# Patient Record
Sex: MALE | Race: WHITE | HISPANIC OR LATINO | Employment: UNEMPLOYED | URBAN - METROPOLITAN AREA
[De-identification: names, ages, dates, MRNs, and addresses within clinical notes are randomized per-mention and may not be internally consistent; named-entity substitution may affect disease eponyms.]

---

## 2021-11-23 ENCOUNTER — OFFICE VISIT (OUTPATIENT)
Dept: SURGERY | Facility: CLINIC | Age: 41
End: 2021-11-23
Payer: COMMERCIAL

## 2021-11-23 VITALS
WEIGHT: 169.19 LBS | DIASTOLIC BLOOD PRESSURE: 68 MMHG | HEIGHT: 66 IN | BODY MASS INDEX: 27.19 KG/M2 | HEART RATE: 76 BPM | SYSTOLIC BLOOD PRESSURE: 115 MMHG

## 2021-11-23 DIAGNOSIS — R10.31 RIGHT GROIN PAIN: Primary | ICD-10-CM

## 2021-11-23 PROCEDURE — 99999 PR PBB SHADOW E&M-NEW PATIENT-LVL III: CPT | Mod: PBBFAC,,, | Performed by: SURGERY

## 2021-11-23 PROCEDURE — 99202 PR OFFICE/OUTPT VISIT, NEW, LEVL II, 15-29 MIN: ICD-10-PCS | Mod: S$GLB,,, | Performed by: SURGERY

## 2021-11-23 PROCEDURE — 99999 PR PBB SHADOW E&M-NEW PATIENT-LVL III: ICD-10-PCS | Mod: PBBFAC,,, | Performed by: SURGERY

## 2021-11-23 PROCEDURE — 99202 OFFICE O/P NEW SF 15 MIN: CPT | Mod: S$GLB,,, | Performed by: SURGERY

## 2021-11-23 RX ORDER — OMEGA-3 FATTY ACIDS/FISH OIL 340-1000MG
CAPSULE ORAL
COMMUNITY
Start: 2021-09-28

## 2021-11-23 RX ORDER — ELVITEGRAVIR, COBICISTAT, EMTRICITABINE, AND TENOFOVIR ALAFENAMIDE 150; 150; 200; 10 MG/1; MG/1; MG/1; MG/1
1 TABLET ORAL DAILY
COMMUNITY
Start: 2021-11-16

## 2021-11-23 RX ORDER — OMEPRAZOLE 40 MG/1
40 CAPSULE, DELAYED RELEASE ORAL DAILY
COMMUNITY
Start: 2021-06-24

## 2021-12-21 ENCOUNTER — HOSPITAL ENCOUNTER (OUTPATIENT)
Dept: RADIOLOGY | Facility: HOSPITAL | Age: 41
Discharge: HOME | End: 2021-12-21
Attending: SURGERY
Payer: COMMERCIAL

## 2021-12-21 DIAGNOSIS — R10.31 RIGHT GROIN PAIN: ICD-10-CM

## 2021-12-21 PROCEDURE — 76870 US EXAM SCROTUM: CPT | Mod: 26,,, | Performed by: RADIOLOGY

## 2021-12-21 PROCEDURE — 76870 US EXAM SCROTUM: CPT | Mod: TC

## 2021-12-21 PROCEDURE — 76870 US SCROTUM AND TESTICLES: ICD-10-PCS | Mod: 26,,, | Performed by: RADIOLOGY

## 2021-12-23 ENCOUNTER — TELEPHONE (OUTPATIENT)
Dept: SURGERY | Facility: CLINIC | Age: 41
End: 2021-12-23
Payer: COMMERCIAL

## 2021-12-23 DIAGNOSIS — N50.89 TESTICULAR MICROLITHIASIS: Primary | ICD-10-CM

## 2022-01-11 ENCOUNTER — OFFICE VISIT (OUTPATIENT)
Dept: UROLOGY | Facility: CLINIC | Age: 42
End: 2022-01-11
Payer: COMMERCIAL

## 2022-01-11 VITALS
HEART RATE: 68 BPM | BODY MASS INDEX: 27.42 KG/M2 | WEIGHT: 170.63 LBS | HEIGHT: 66 IN | DIASTOLIC BLOOD PRESSURE: 81 MMHG | SYSTOLIC BLOOD PRESSURE: 129 MMHG

## 2022-01-11 DIAGNOSIS — N50.811 RIGHT TESTICULAR PAIN: Primary | ICD-10-CM

## 2022-01-11 DIAGNOSIS — N50.89 TESTICULAR MICROLITHIASIS: ICD-10-CM

## 2022-01-11 PROCEDURE — 99203 OFFICE O/P NEW LOW 30 MIN: CPT | Mod: S$GLB,,, | Performed by: NURSE PRACTITIONER

## 2022-01-11 PROCEDURE — 3008F PR BODY MASS INDEX (BMI) DOCUMENTED: ICD-10-PCS | Mod: CPTII,S$GLB,, | Performed by: NURSE PRACTITIONER

## 2022-01-11 PROCEDURE — 3008F BODY MASS INDEX DOCD: CPT | Mod: CPTII,S$GLB,, | Performed by: NURSE PRACTITIONER

## 2022-01-11 PROCEDURE — 3074F PR MOST RECENT SYSTOLIC BLOOD PRESSURE < 130 MM HG: ICD-10-PCS | Mod: CPTII,S$GLB,, | Performed by: NURSE PRACTITIONER

## 2022-01-11 PROCEDURE — 1160F PR REVIEW ALL MEDS BY PRESCRIBER/CLIN PHARMACIST DOCUMENTED: ICD-10-PCS | Mod: CPTII,S$GLB,, | Performed by: NURSE PRACTITIONER

## 2022-01-11 PROCEDURE — 99203 PR OFFICE/OUTPT VISIT, NEW, LEVL III, 30-44 MIN: ICD-10-PCS | Mod: S$GLB,,, | Performed by: NURSE PRACTITIONER

## 2022-01-11 PROCEDURE — 1159F MED LIST DOCD IN RCRD: CPT | Mod: CPTII,S$GLB,, | Performed by: NURSE PRACTITIONER

## 2022-01-11 PROCEDURE — 3079F DIAST BP 80-89 MM HG: CPT | Mod: CPTII,S$GLB,, | Performed by: NURSE PRACTITIONER

## 2022-01-11 PROCEDURE — 3074F SYST BP LT 130 MM HG: CPT | Mod: CPTII,S$GLB,, | Performed by: NURSE PRACTITIONER

## 2022-01-11 PROCEDURE — 3079F PR MOST RECENT DIASTOLIC BLOOD PRESSURE 80-89 MM HG: ICD-10-PCS | Mod: CPTII,S$GLB,, | Performed by: NURSE PRACTITIONER

## 2022-01-11 PROCEDURE — 99999 PR PBB SHADOW E&M-EST. PATIENT-LVL III: ICD-10-PCS | Mod: PBBFAC,,, | Performed by: NURSE PRACTITIONER

## 2022-01-11 PROCEDURE — 1159F PR MEDICATION LIST DOCUMENTED IN MEDICAL RECORD: ICD-10-PCS | Mod: CPTII,S$GLB,, | Performed by: NURSE PRACTITIONER

## 2022-01-11 PROCEDURE — 99999 PR PBB SHADOW E&M-EST. PATIENT-LVL III: CPT | Mod: PBBFAC,,, | Performed by: NURSE PRACTITIONER

## 2022-01-11 PROCEDURE — 1160F RVW MEDS BY RX/DR IN RCRD: CPT | Mod: CPTII,S$GLB,, | Performed by: NURSE PRACTITIONER

## 2022-01-11 NOTE — PROGRESS NOTES
: Essence [id number]    CHIEF COMPLAINT:    Mr. Del Rio is a 41 y.o. male presenting for testicular microlithiasis.      PRESENTING ILLNESS:    Cooper Del Rio is a 41 y.o. male who presents for testicular microlithiasis    Presents today as referral due to findings of testicular microlithiasis.  An u/s was obtained due to right scrotal pain.  Continues to have right testicular pain with a hard days work.  Discussed taking NSAIds for discomfort, wearing supportive underwear, and applying ice to scrotum in the evening.      Discussed finding of testicular microlithiasis on ultrasounds.  Has no known risk factors.  Denies family or personal history of testicular cancer.  Will repeat ultrasound in 6 months to reassess for changes.    He reports a good urinary stream and complete bladder emptying. Has no urinary complaints today.    No family history of prostate cancer.     REVIEW OF SYSTEMS:    Review of Systems   Constitutional: Negative for chills and fever.   Respiratory: Negative for shortness of breath.    Cardiovascular: Negative for chest pain.   Gastrointestinal: Negative for constipation and diarrhea.   Genitourinary: Negative for dysuria, flank pain, frequency, hematuria and urgency.   Neurological: Negative for dizziness and weakness.       PATIENT HISTORY:    No past medical history on file.    No family history on file.    Allergies:  Patient has no known allergies.    Medications:    Current Outpatient Medications:     GENVOYA [phone] mg Tab, Take 1 tablet by mouth once daily., Disp: , Rfl:     FISH -1,000 mg Cap, Take by mouth., Disp: , Rfl:     omeprazole (PRILOSEC) 40 MG capsule, Take 40 mg by mouth once daily., Disp: , Rfl:     PHYSICAL EXAMINATION:    Physical Exam  Vitals and nursing note reviewed.   Constitutional:       Appearance: Normal appearance. He is well-developed.   HENT:      Head: Normocephalic and atraumatic.   Eyes:      Pupils: Pupils are equal, round, and  reactive to light.   Pulmonary:      Effort: Pulmonary effort is normal.   Genitourinary:     Penis: Normal.       Testes: Normal.   Musculoskeletal:         General: Normal range of motion.      Cervical back: Normal range of motion.   Skin:     General: Skin is warm and dry.   Neurological:      Mental Status: He is alert and oriented to person, place, and time.   Psychiatric:         Mood and Affect: Mood and affect normal.         Behavior: Behavior normal.           LABS:    No results found for: PSA, PSADIAG, PSATOTAL, PSAFREE, PSAFREEPCT    IMPRESSION:  Encounter Diagnoses   Name Primary?    Testicular microlithiasis      US Scrotum and Testes 12/21/21:  Impression:     1. Testicular microlithiasis without intratesticular mass or worrisome finding. In the absence of any other risk factors for testicular cancer (personal history of testicular cancer, family member with testicular cancer, history of cryptorchidism, testicular atrophy or other risk factors) no further imaging or biochemical follow-up is necessary; all that is recommended is routine monthly testicular self-examination. However, if the patient has risk factors for testicular cancer, referral to a urologist for evaluation at the termination of and a optimal follow-up strategy is recommended.  2. Right varicocele.  3. Bilateral epididymal head cysts.       PLAN:  Problem List Items Addressed This Visit    None     Visit Diagnoses     Testicular microlithiasis              1. Testicular microlithiasis   - repeat imaging in 6 mths   - no risk factory noted  2. Right testicular pain   For scrotal pain: ibuprofen, good scrotal support, rest, elevate scrotum at night  3.  RTC in 6 mths with imaging prior to visit    Tatum Inman NP

## 2022-10-24 ENCOUNTER — TELEPHONE (OUTPATIENT)
Dept: HEPATOLOGY | Facility: CLINIC | Age: 42
End: 2022-10-24
Payer: COMMERCIAL

## 2023-01-09 NOTE — PROGRESS NOTES
[facility] Consultation Note    Reason for Consult:  The primary encounter diagnosis was Fatty liver. A diagnosis of Elevated liver enzymes was also pertinent to this visit.    PCP: Liam Baker   [address] [facility]*    HPI:  This is a 42 y.o. male here for evaluation of: Cirrhosis?  Elevated transaminases AST 67,  , but PLT normal, 221k on 1/9/2023    Referred by Dr Wheeler for cirrhosis.      Review of record in Epic shows very little information.     He has some labs in 2019 in [facility] record, his HAV Ab was positive, HBsAg, was neg, HCV Ab neg, HIV 1 positive (on treatment for 14 years, currently on Genvoya). No imaging available. Plt ct was 177, 183 in 2019.        Care Everywhere 4 yrs ago:  EGD at [facility] for epigastric pain. H Pylori was positive. Treated then.    He was also told he had fatty liver, but not cirrhosis.      US abd 4/30/2019:  1. Hepatic steatosis.   2. No cholelithiasis or evidence of cholecystitis.     He had CT A/P 5/2019 without hepatobiliary or splenic abnormalities.     A new CT scan was done at [facility] two days ago, 1/10/23:   LIVER: Normal.   GALLBLADDER & EXTRAHEPATIC DUCT: Normal.   PANCREAS: Normal.   SPLEEN: Normal.     IMPRESSION:   No evidence of acute intra-abdominal or pelvic abnormalities. No evidence of acute appendicitis.      He follows with Dr. Irizarry in [place] for his HIV and remains on medication with undetectable viral load.     Hep B neg, HCV Ab neg in 2019.   Immune to hep A 2019.        Elevated liver enzymes: Yes  Abnormal imaging: No  Cirrhosis: No  Hepatitis C: No  Hepatitis B: No  Fatty liver: Yes  Encephalopathy: No  Post-hospital discharge: No  Symptoms: none    Primary hepatic manifestations:  Fatigue:No  Edema:No  Ascites:No  Encephalopathy:No  Abdominal pain:No  GI bleeds: No  Pruritus:No  Weight Changes:No  Changes in Bowel habits: No  Muscle cramps:No    Risk factors for liver  "disease:  No jaundice  No transfusions  No IVDU  Did not snort cocaine or similar agents  Did not live with anyone with hepatitis B or C  Sexual partner not tested  No hepatotoxic medications  No exposure to industrial toxins  Alcohol:  Drinks 3-6 beers every day after work x 20 yrs.  He thinks he can stop alcohol whenever he is told to.         ROS:  Constitutional: No fevers, chills, weight changes, fatigue  ENT: No allergies, nosebleeds,   CV: No chest pain  Pulm: No cough, shortness of breath  Ophtho: No vision changes  GI/Liver: see HPI  Derm: No rash, itching  Heme: No swollen glands, bruising  MSK: No joint pains, joint swelling  : No dysuria, hematuria, decrease in urine output  Endo: No hot or cold intolerance  Neuro: No confusion, disorientation, difficulty with sleep, memory, concentration, syncope, seizure  Psych: No anxiety, depression    Medical History:  has no past medical history on file.    Surgical History:  has no past surgical history on file.    Family History: family history is not on file..     Social History:  reports that he has never smoked. He has never used smokeless tobacco. He reports current alcohol use of about 6.0 standard drinks per week. He reports that he does not use drugs.    Review of patient's allergies indicates:  No Known Allergies    Current Outpatient Rx   Medication Sig Dispense Refill    amLODIPine (NORVASC) 2.5 MG tablet Take 2.5 mg by mouth.      famotidine (PEPCID) 20 MG tablet Take 20 mg by mouth 2 (two) times daily.      FISH -1,000 mg Cap Take by mouth.      GENVOYA [phone] mg Tab Take 1 tablet by mouth once daily.      omeprazole (PRILOSEC) 40 MG capsule Take 40 mg by mouth once daily.         Objective Findings:    Vital Signs:  /66 (BP Location: Right arm, Patient Position: Sitting, BP Method: Medium (Automatic))   Pulse 78   Temp 98 °F (36.7 °C) (Oral)   Resp 18   Ht 5' 6" (1.676 m)   Wt 76.7 kg (169 lb 1.5 oz)   SpO2 97%   BMI " 27.29 kg/m²   Body mass index is 27.29 kg/m².    Physical Exam:  General Appearance: Well appearing in no acute distress  Head:   Normocephalic, without obvious abnormality  Eyes:    No scleral icterus, EOMI  Abdomen: Soft, non tender, non distended . No hepatosplenomegaly, ascites, or mass  Extremities:  no clubbing, cyanosis or edema  Skin: No rash  Neurologic:  alert, oriented x 3. No asterixis      Labs:  No results found for: WBC, HGB, HCT, PLT, CHOL, TRIG, HDL, LDLDIRECT, INR, CREATININE, BUN, BILITOT, ALT, AST, ALKPHOS, NA, K, CL, CO2, TSH, PSA, GLUF, HGBA1C, MICROALBUR, AFP    Imaging:   In care Everywhere: neg for cirrhosis    Endoscopy:      Assessment:  1. Fatty liver    2. Elevated liver enzymes    Probably alcohol-related enzyme elevation.  Will check other serologies for etiology.   Imaging from 2019 and 1/10/23 not supportive of cirrhosis.   Will check with fibroscan in 3 months after he stops alcohol.        Recommendations:  -  Fibroscan in 3 months.   -  Labs today:  Liver profile, PANCHO, Ceruloplasmin, Alpha-1 antitrypsin, iron saturation, Ferritin  -  Labs in 3 months: liver profile after stopping alcohol x 3 months  -  Avoid alcohol, smoking, sedatives and meds with codeine.  -  Return in 3 months      Follow up in about 3 months (around 4/12/2023).      Order summary:  Orders Placed This Encounter   Procedures    FibroScan [place]    PANCHO Screen w/Reflex    Ceruloplasmin    Alpha 1 Antitrypsin Phenotype    Ferritin    Iron and TIBC    Hepatic Function Panel       Thank you so much for allowing me to participate in the care of Cooper Pro MD

## 2023-01-12 ENCOUNTER — LAB VISIT (OUTPATIENT)
Dept: LAB | Facility: HOSPITAL | Age: 43
End: 2023-01-12
Attending: INTERNAL MEDICINE
Payer: COMMERCIAL

## 2023-01-12 ENCOUNTER — OFFICE VISIT (OUTPATIENT)
Dept: HEPATOLOGY | Facility: CLINIC | Age: 43
End: 2023-01-12
Payer: COMMERCIAL

## 2023-01-12 VITALS
DIASTOLIC BLOOD PRESSURE: 66 MMHG | OXYGEN SATURATION: 97 % | SYSTOLIC BLOOD PRESSURE: 122 MMHG | RESPIRATION RATE: 18 BRPM | HEART RATE: 78 BPM | BODY MASS INDEX: 27.17 KG/M2 | HEIGHT: 66 IN | TEMPERATURE: 98 F | WEIGHT: 169.06 LBS

## 2023-01-12 DIAGNOSIS — R74.8 ELEVATED LIVER ENZYMES: ICD-10-CM

## 2023-01-12 DIAGNOSIS — K76.0 FATTY LIVER: Primary | ICD-10-CM

## 2023-01-12 LAB
ALBUMIN SERPL BCP-MCNC: 4 G/DL (ref 3.5–5.2)
ALP SERPL-CCNC: 94 U/L (ref 55–135)
ALT SERPL W/O P-5'-P-CCNC: 186 U/L (ref 10–44)
AST SERPL-CCNC: 71 U/L (ref 10–40)
BILIRUB DIRECT SERPL-MCNC: 0.2 MG/DL (ref 0.1–0.3)
BILIRUB SERPL-MCNC: 0.4 MG/DL (ref 0.1–1)
CERULOPLASMIN SERPL-MCNC: 36 MG/DL (ref 15–45)
FERRITIN SERPL-MCNC: 671 NG/ML (ref 20–300)
IRON SERPL-MCNC: 66 UG/DL (ref 45–160)
PROT SERPL-MCNC: 8.6 G/DL (ref 6–8.4)
SATURATED IRON: 15 % (ref 20–50)
TOTAL IRON BINDING CAPACITY: 440 UG/DL (ref 250–450)
TRANSFERRIN SERPL-MCNC: 297 MG/DL (ref 200–375)

## 2023-01-12 PROCEDURE — 82390 ASSAY OF CERULOPLASMIN: CPT | Performed by: INTERNAL MEDICINE

## 2023-01-12 PROCEDURE — 1159F PR MEDICATION LIST DOCUMENTED IN MEDICAL RECORD: ICD-10-PCS | Mod: CPTII,S$GLB,, | Performed by: INTERNAL MEDICINE

## 2023-01-12 PROCEDURE — 99999 PR PBB SHADOW E&M-EST. PATIENT-LVL IV: CPT | Mod: PBBFAC,,, | Performed by: INTERNAL MEDICINE

## 2023-01-12 PROCEDURE — 80076 HEPATIC FUNCTION PANEL: CPT | Performed by: INTERNAL MEDICINE

## 2023-01-12 PROCEDURE — 86038 ANTINUCLEAR ANTIBODIES: CPT | Performed by: INTERNAL MEDICINE

## 2023-01-12 PROCEDURE — 3078F PR MOST RECENT DIASTOLIC BLOOD PRESSURE < 80 MM HG: ICD-10-PCS | Mod: CPTII,S$GLB,, | Performed by: INTERNAL MEDICINE

## 2023-01-12 PROCEDURE — 82103 ALPHA-1-ANTITRYPSIN TOTAL: CPT | Performed by: INTERNAL MEDICINE

## 2023-01-12 PROCEDURE — 3008F PR BODY MASS INDEX (BMI) DOCUMENTED: ICD-10-PCS | Mod: CPTII,S$GLB,, | Performed by: INTERNAL MEDICINE

## 2023-01-12 PROCEDURE — 3074F SYST BP LT 130 MM HG: CPT | Mod: CPTII,S$GLB,, | Performed by: INTERNAL MEDICINE

## 2023-01-12 PROCEDURE — 3078F DIAST BP <80 MM HG: CPT | Mod: CPTII,S$GLB,, | Performed by: INTERNAL MEDICINE

## 2023-01-12 PROCEDURE — 1159F MED LIST DOCD IN RCRD: CPT | Mod: CPTII,S$GLB,, | Performed by: INTERNAL MEDICINE

## 2023-01-12 PROCEDURE — 82728 ASSAY OF FERRITIN: CPT | Performed by: INTERNAL MEDICINE

## 2023-01-12 PROCEDURE — 99999 PR PBB SHADOW E&M-EST. PATIENT-LVL IV: ICD-10-PCS | Mod: PBBFAC,,, | Performed by: INTERNAL MEDICINE

## 2023-01-12 PROCEDURE — 36415 COLL VENOUS BLD VENIPUNCTURE: CPT | Performed by: INTERNAL MEDICINE

## 2023-01-12 PROCEDURE — 3008F BODY MASS INDEX DOCD: CPT | Mod: CPTII,S$GLB,, | Performed by: INTERNAL MEDICINE

## 2023-01-12 PROCEDURE — 84466 ASSAY OF TRANSFERRIN: CPT | Performed by: INTERNAL MEDICINE

## 2023-01-12 PROCEDURE — 82104 ALPHA-1-ANTITRYPSIN PHENO: CPT | Performed by: INTERNAL MEDICINE

## 2023-01-12 PROCEDURE — 99205 PR OFFICE/OUTPT VISIT, NEW, LEVL V, 60-74 MIN: ICD-10-PCS | Mod: S$GLB,,, | Performed by: INTERNAL MEDICINE

## 2023-01-12 PROCEDURE — 99205 OFFICE O/P NEW HI 60 MIN: CPT | Mod: S$GLB,,, | Performed by: INTERNAL MEDICINE

## 2023-01-12 PROCEDURE — 3074F PR MOST RECENT SYSTOLIC BLOOD PRESSURE < 130 MM HG: ICD-10-PCS | Mod: CPTII,S$GLB,, | Performed by: INTERNAL MEDICINE

## 2023-01-12 RX ORDER — FAMOTIDINE 20 MG/1
20 TABLET, FILM COATED ORAL 2 TIMES DAILY
COMMUNITY
Start: 2023-01-10

## 2023-01-12 RX ORDER — AMLODIPINE BESYLATE 2.5 MG/1
2.5 TABLET ORAL
COMMUNITY
Start: 2022-10-06

## 2023-01-12 NOTE — PATIENT INSTRUCTIONS
Recommendations:  -  Fibroscan in 3 months.   -  Labs today:  Liver profile, PANCHO, Ceruloplasmin, Alpha-1 antitrypsin, iron saturation, Ferritin  -  Labs in 3 months: liver profile after stopping alcohol x 3 months  -  Avoid alcohol, smoking, sedatives and meds with codeine.  -  Return in 3 months

## 2023-01-13 LAB — ANA SER QL IF: NORMAL

## 2023-01-18 LAB
A1AT PHENOTYP SERPL-IMP: ABNORMAL
A1AT SERPL NEPH-MCNC: 193 MG/DL (ref 100–190)

## 2023-01-19 ENCOUNTER — TELEPHONE (OUTPATIENT)
Dept: HEPATOLOGY | Facility: CLINIC | Age: 43
End: 2023-01-19
Payer: COMMERCIAL

## 2023-01-19 NOTE — TELEPHONE ENCOUNTER
----- Message from Yeni Pro MD sent at 1/12/2023 11:22 AM CST -----  Recommendations:  -  Fibroscan in 3 months.   -  Labs today:  Liver profile, PANCHO, Ceruloplasmin, Alpha-1 antitrypsin, iron saturation, Ferritin  -  Labs in 3 months: liver profile after stopping alcohol x 3 months  -  Avoid alcohol, smoking, sedatives and meds with codeine.  -  Return in 3 months

## 2023-01-19 NOTE — TELEPHONE ENCOUNTER
Fibroscan in 3 months.   -  Labs today:  Liver profile, PANCHO, Ceruloplasmin, Alpha-1 antitrypsin, iron saturation, Ferritin   -  Labs in 3 months: liver profile after stopping alcohol x 3 months   Return in 3 months.   All appts schd in office. IBRAHIMA STOREY

## 2023-01-30 ENCOUNTER — PATIENT MESSAGE (OUTPATIENT)
Dept: HEPATOLOGY | Facility: CLINIC | Age: 43
End: 2023-01-30
Payer: COMMERCIAL

## 2023-02-04 DIAGNOSIS — F10.10 ALCOHOL ABUSE: Primary | ICD-10-CM

## 2023-02-07 ENCOUNTER — TELEPHONE (OUTPATIENT)
Dept: HEPATOLOGY | Facility: CLINIC | Age: 43
End: 2023-02-07
Payer: COMMERCIAL

## 2023-02-07 NOTE — TELEPHONE ENCOUNTER
[facility] Dr. Dumont. TA,MA

## 2023-03-07 ENCOUNTER — LAB VISIT (OUTPATIENT)
Dept: LAB | Facility: HOSPITAL | Age: 43
End: 2023-03-07
Attending: INTERNAL MEDICINE
Payer: COMMERCIAL

## 2023-03-07 DIAGNOSIS — F10.10 ALCOHOL ABUSE: ICD-10-CM

## 2023-03-07 PROCEDURE — 80321 ALCOHOLS BIOMARKERS 1OR 2: CPT | Performed by: INTERNAL MEDICINE

## 2023-03-07 PROCEDURE — 36415 COLL VENOUS BLD VENIPUNCTURE: CPT | Performed by: INTERNAL MEDICINE

## 2023-03-10 LAB
CLINICAL BIOCHEMIST REVIEW: NORMAL
PLPETH BLD-MCNC: NORMAL NG/ML
POPETH BLD-MCNC: 220 NG/ML

## 2025-05-29 DIAGNOSIS — K76.0 FATTY LIVER: ICD-10-CM

## 2025-05-29 DIAGNOSIS — R79.89 ABNORMAL LIVER FUNCTION TESTS: Primary | ICD-10-CM

## 2025-05-30 ENCOUNTER — TELEPHONE (OUTPATIENT)
Dept: HEPATOLOGY | Facility: CLINIC | Age: 45
End: 2025-05-30
Payer: COMMERCIAL

## 2025-05-30 DIAGNOSIS — R74.8 ELEVATED LIVER ENZYMES: ICD-10-CM

## 2025-05-30 DIAGNOSIS — K76.0 FATTY LIVER: Primary | ICD-10-CM

## 2025-05-30 NOTE — TELEPHONE ENCOUNTER
Pt was scheduled to see provider June 4 at 330 pm and 3 pm for Fibroscan... pt also has labs coming June 2

## 2025-06-04 ENCOUNTER — LAB VISIT (OUTPATIENT)
Dept: LAB | Facility: HOSPITAL | Age: 45
End: 2025-06-04
Attending: INTERNAL MEDICINE
Payer: COMMERCIAL

## 2025-06-04 ENCOUNTER — OFFICE VISIT (OUTPATIENT)
Dept: HEPATOLOGY | Facility: CLINIC | Age: 45
End: 2025-06-04
Payer: COMMERCIAL

## 2025-06-04 ENCOUNTER — TELEPHONE (OUTPATIENT)
Dept: HEPATOLOGY | Facility: CLINIC | Age: 45
End: 2025-06-04
Payer: COMMERCIAL

## 2025-06-04 ENCOUNTER — PROCEDURE VISIT (OUTPATIENT)
Dept: HEPATOLOGY | Facility: CLINIC | Age: 45
End: 2025-06-04
Payer: COMMERCIAL

## 2025-06-04 VITALS
BODY MASS INDEX: 28.27 KG/M2 | SYSTOLIC BLOOD PRESSURE: 131 MMHG | HEIGHT: 66 IN | TEMPERATURE: 98 F | DIASTOLIC BLOOD PRESSURE: 85 MMHG | WEIGHT: 175.94 LBS | OXYGEN SATURATION: 97 % | HEART RATE: 85 BPM

## 2025-06-04 DIAGNOSIS — K76.0 FATTY LIVER: ICD-10-CM

## 2025-06-04 DIAGNOSIS — R79.89 ABNORMAL LIVER FUNCTION TESTS: ICD-10-CM

## 2025-06-04 DIAGNOSIS — R74.8 ELEVATED LIVER ENZYMES: ICD-10-CM

## 2025-06-04 DIAGNOSIS — K70.9 ALCOHOLIC LIVER DISEASE: Primary | ICD-10-CM

## 2025-06-04 LAB
ABSOLUTE EOSINOPHIL (OHS): 0.26 K/UL
ABSOLUTE MONOCYTE (OHS): 0.52 K/UL (ref 0.3–1)
ABSOLUTE NEUTROPHIL COUNT (OHS): 2.2 K/UL (ref 1.8–7.7)
ALBUMIN SERPL BCP-MCNC: 4.1 G/DL (ref 3.5–5.2)
ALP SERPL-CCNC: 102 UNIT/L (ref 40–150)
ALT SERPL W/O P-5'-P-CCNC: 119 UNIT/L (ref 10–44)
ANION GAP (OHS): 8 MMOL/L (ref 8–16)
AST SERPL-CCNC: 52 UNIT/L (ref 11–45)
BASOPHILS # BLD AUTO: 0.02 K/UL
BASOPHILS NFR BLD AUTO: 0.4 %
BILIRUB SERPL-MCNC: 0.2 MG/DL (ref 0.1–1)
BUN SERPL-MCNC: 10 MG/DL (ref 6–20)
CALCIUM SERPL-MCNC: 9.2 MG/DL (ref 8.7–10.5)
CHLORIDE SERPL-SCNC: 103 MMOL/L (ref 95–110)
CO2 SERPL-SCNC: 26 MMOL/L (ref 23–29)
CREAT SERPL-MCNC: 0.9 MG/DL (ref 0.5–1.4)
ERYTHROCYTE [DISTWIDTH] IN BLOOD BY AUTOMATED COUNT: 12.5 % (ref 11.5–14.5)
GFR SERPLBLD CREATININE-BSD FMLA CKD-EPI: >60 ML/MIN/1.73/M2
GLUCOSE SERPL-MCNC: 188 MG/DL (ref 70–110)
HCT VFR BLD AUTO: 45.3 % (ref 40–54)
HGB BLD-MCNC: 15.2 GM/DL (ref 14–18)
IMM GRANULOCYTES # BLD AUTO: 0.01 K/UL (ref 0–0.04)
IMM GRANULOCYTES NFR BLD AUTO: 0.2 % (ref 0–0.5)
INR PPP: 1 (ref 0.8–1.2)
LYMPHOCYTES # BLD AUTO: 1.68 K/UL (ref 1–4.8)
MCH RBC QN AUTO: 33.9 PG (ref 27–31)
MCHC RBC AUTO-ENTMCNC: 33.6 G/DL (ref 32–36)
MCV RBC AUTO: 101 FL (ref 82–98)
NUCLEATED RBC (/100WBC) (OHS): 0 /100 WBC
PLATELET # BLD AUTO: 185 K/UL (ref 150–450)
PMV BLD AUTO: 12.2 FL (ref 9.2–12.9)
POTASSIUM SERPL-SCNC: 4.5 MMOL/L (ref 3.5–5.1)
PROT SERPL-MCNC: 7.6 GM/DL (ref 6–8.4)
PROTHROMBIN TIME: 11 SECONDS (ref 9–12.5)
RBC # BLD AUTO: 4.48 M/UL (ref 4.6–6.2)
RELATIVE EOSINOPHIL (OHS): 5.5 %
RELATIVE LYMPHOCYTE (OHS): 35.8 % (ref 18–48)
RELATIVE MONOCYTE (OHS): 11.1 % (ref 4–15)
RELATIVE NEUTROPHIL (OHS): 47 % (ref 38–73)
SODIUM SERPL-SCNC: 137 MMOL/L (ref 136–145)
WBC # BLD AUTO: 4.69 K/UL (ref 3.9–12.7)

## 2025-06-04 PROCEDURE — 3008F BODY MASS INDEX DOCD: CPT | Mod: CPTII,S$GLB,, | Performed by: INTERNAL MEDICINE

## 2025-06-04 PROCEDURE — 99999 PR PBB SHADOW E&M-EST. PATIENT-LVL IV: CPT | Mod: PBBFAC,,, | Performed by: INTERNAL MEDICINE

## 2025-06-04 PROCEDURE — 99213 OFFICE O/P EST LOW 20 MIN: CPT | Mod: S$GLB,,, | Performed by: INTERNAL MEDICINE

## 2025-06-04 PROCEDURE — 91200 LIVER ELASTOGRAPHY: CPT | Mod: S$GLB,,, | Performed by: INTERNAL MEDICINE

## 2025-06-04 PROCEDURE — 85025 COMPLETE CBC W/AUTO DIFF WBC: CPT

## 2025-06-04 PROCEDURE — 85610 PROTHROMBIN TIME: CPT

## 2025-06-04 PROCEDURE — 82374 ASSAY BLOOD CARBON DIOXIDE: CPT

## 2025-06-04 PROCEDURE — 1159F MED LIST DOCD IN RCRD: CPT | Mod: CPTII,S$GLB,, | Performed by: INTERNAL MEDICINE

## 2025-06-04 PROCEDURE — 3075F SYST BP GE 130 - 139MM HG: CPT | Mod: CPTII,S$GLB,, | Performed by: INTERNAL MEDICINE

## 2025-06-04 PROCEDURE — 36415 COLL VENOUS BLD VENIPUNCTURE: CPT

## 2025-06-04 PROCEDURE — 3079F DIAST BP 80-89 MM HG: CPT | Mod: CPTII,S$GLB,, | Performed by: INTERNAL MEDICINE

## 2025-06-04 NOTE — Clinical Note
Recommendations: -  Fibroscan in 1 year (6/2026).    -  Labs today:  Liver profile every 3 months -  Stop alcohol.  -  Return in 6 months

## 2025-06-04 NOTE — Clinical Note
Please send this report to patient:  Fibroscan 6/4/2025  Findings Median liver stiffness score:  8.7 CAP Reading: dB/m:  256   IQR/med %:  11 Interpretation Fibrosis interpretation is based on medial liver stiffness - Kilopascal (kPa).   Fibrosis Stage:  F2 Steatosis interpretation is based on controlled attenuation parameter - (dB/m).   Steatosis Grade:  S1 Comments/Plan:   Fibrosis Stage F2 = Moderate amount of (fibrosis) scarring in the liver   Steatosis Grade S1 = >11% but <34% fat in the liver   Yeni Pro MD

## 2025-06-04 NOTE — PROGRESS NOTES
[facility] Consultation Note    Reason for Consult:  The primary encounter diagnosis was Alcoholic liver disease. Diagnoses of Abnormal liver function tests and Fatty liver were also pertinent to this visit.    PCP: Liam Baker   [address] [place] [facility]*        HPI:  This is a 44 y.o. male here for evaluation of: Cirrhosis?  Elevated transaminases AST 67,  , but PLT normal, 221k on 1/9/2023     online.      Referred by Dr Wheeler for cirrhosis.      Review of labs shows minimal improvement, so the question is has he stopped drinking alcohol?  He hasn't stopped, drinks every day.  Accompanying wife states, he has not done it, but he can stop when he makes up his mind.  I have told him he needs to stop.      I have told him slowly cutting down does not work,  he has to stop all alcohol.      He has some labs in 2019 in [facility] record, his HAV Ab was positive, HBsAg, was neg, HCV Ab neg, HIV 1 positive (on treatment for 14 years, currently on Genvoya). No imaging available. Plt ct was 177, 183 in 2019.        Care Everywhere 4 yrs ago:  EGD at [facility] for epigastric pain. H Pylori was positive. Treated then.  He was also told he had fatty liver, but not cirrhosis.      US abd 4/30/2019:  1. Hepatic steatosis.   2. No cholelithiasis or evidence of cholecystitis.     He had CT A/P 5/2019 without hepatobiliary or splenic abnormalities.     A new CT scan was done at [facility] two days ago, 1/10/23:   LIVER: Normal.   GALLBLADDER & EXTRAHEPATIC DUCT: Normal.   PANCREAS: Normal.   SPLEEN: Normal.     IMPRESSION:   No evidence of acute intra-abdominal or pelvic abnormalities. No evidence of acute appendicitis.      He follows with Dr. Irizarry in [place] for his HIV and remains on medication with undetectable viral load.     Hep B neg, HCV Ab neg in 2019.   Immune to hep A 2019.        Elevated liver enzymes: Yes  Abnormal imaging:  No  Cirrhosis: No  Hepatitis C: No  Hepatitis B: No  Fatty liver: Yes  Encephalopathy: No  Post-hospital discharge: No  Symptoms: none    Primary hepatic manifestations:  Fatigue:No  Edema:No  Ascites:No  Encephalopathy:No  Abdominal pain:No  GI bleeds: No  Pruritus:No  Weight Changes:No  Changes in Bowel habits: No  Muscle cramps:No    Risk factors for liver disease:  No jaundice  No transfusions  No IVDU  Did not snort cocaine or similar agents  Did not live with anyone with hepatitis B or C  Sexual partner not tested  No hepatotoxic medications  No exposure to industrial toxins  Alcohol:  Drinks 3-6 beers every day after work x 20 yrs.  He thinks he can stop alcohol whenever he is told to.         ROS:  Constitutional: No fevers, chills, weight changes, fatigue  ENT: No allergies, nosebleeds,   CV: No chest pain  Pulm: No cough, shortness of breath  Ophtho: No vision changes  GI/Liver: see HPI  Derm: No rash, itching  Heme: No swollen glands, bruising  MSK: No joint pains, joint swelling  : No dysuria, hematuria, decrease in urine output  Endo: No hot or cold intolerance  Neuro: No confusion, disorientation, difficulty with sleep, memory, concentration, syncope, seizure  Psych: No anxiety, depression    Medical History:  has no past medical history on file.    Surgical History:  has no past surgical history on file.    Family History: family history is not on file..     Social History:  reports that he has never smoked. He has never used smokeless tobacco. He reports current alcohol use of about 6.0 standard drinks of alcohol per week. He reports that he does not use drugs.    Review of patient's allergies indicates:  No Known Allergies    Current Outpatient Rx   Medication Sig Dispense Refill    amLODIPine (NORVASC) 2.5 MG tablet Take 2.5 mg by mouth. (Patient not taking: Reported on 6/4/2025)      famotidine (PEPCID) 20 MG tablet Take 20 mg by mouth 2 (two) times daily. (Patient not taking: Reported on  "6/4/2025)      FISH -1,000 mg Cap Take by mouth. (Patient not taking: Reported on 6/4/2025)      GENVOYA [phone] mg Tab Take 1 tablet by mouth once daily. (Patient not taking: Reported on 6/4/2025)      omeprazole (PRILOSEC) 40 MG capsule Take 40 mg by mouth once daily. (Patient not taking: Reported on 6/4/2025)         Objective Findings:    Vital Signs:  /85 (Patient Position: Sitting)   Pulse 85   Temp 98.4 °F (36.9 °C) (Oral)   Ht 5' 6" (1.676 m)   Wt 79.8 kg (175 lb 14.8 oz)   SpO2 97%   BMI 28.40 kg/m²   Body mass index is 28.4 kg/m².    Physical Exam:  General Appearance: Well appearing in no acute distress  Head:   Normocephalic, without obvious abnormality  Eyes:    No scleral icterus, EOMI  Abdomen: Soft, non tender, non distended . No hepatosplenomegaly, ascites, or mass  Extremities:  no clubbing, cyanosis or edema  Skin: No rash  Neurologic:  alert, oriented x 3. No asterixis      Labs:  Lab Results   Component Value Date    WBC 4.69 06/04/2025    HGB 15.2 06/04/2025    HCT 45.3 06/04/2025     06/04/2025    INR 1.0 06/04/2025    CREATININE 0.9 06/04/2025    BUN 10 06/04/2025    BILITOT 0.2 06/04/2025     (H) 06/04/2025    AST 52 (H) 06/04/2025    ALKPHOS 102 06/04/2025     06/04/2025    K 4.5 06/04/2025     06/04/2025    CO2 26 06/04/2025       Imaging:   In care Everywhere: neg for cirrhosis    Endoscopy:      Assessment:  1. Alcoholic liver disease    2. Abnormal liver function tests    3. Fatty liver      Probably alcohol-related enzyme elevation.  Will check other serologies for etiology.   Imaging from 2019 and 1/10/23 not supportive of cirrhosis.     Fibroscan today 6/4/25:  , S2= >34%fat, <67% fat.   KPa 8.7 = F2 = moderate amount of fibrosis.      I have advised him to STOP all alcohol.       Recommendations:  -  Fibroscan in 1 year (6/2026).     -  Labs today:  Liver profile every 3 months  -  Stop alcohol.   -  Return in 6 " months      Follow up in about 6 months (around 12/4/2025).      Order summary:  Orders Placed This Encounter   Procedures    Hepatic Function Panel       Thank you so much for allowing me to participate in the care of Cooper Pro MD       No

## 2025-06-08 ENCOUNTER — RESULTS FOLLOW-UP (OUTPATIENT)
Dept: TRANSPLANT | Facility: CLINIC | Age: 45
End: 2025-06-08

## 2025-06-10 ENCOUNTER — PATIENT MESSAGE (OUTPATIENT)
Dept: HEPATOLOGY | Facility: CLINIC | Age: 45
End: 2025-06-10
Payer: COMMERCIAL

## 2025-06-10 ENCOUNTER — TELEPHONE (OUTPATIENT)
Dept: HEPATOLOGY | Facility: CLINIC | Age: 45
End: 2025-06-10
Payer: COMMERCIAL

## 2025-06-10 NOTE — PROCEDURES
FibroScan Transplant Hepatology(Vibration Controlled Transient Elastography)    Date/Time: 6/4/2025 3:00 PM    Performed by: Yeni Pro MD  Authorized by: Yeni Pro MD    Diagnosis:  NAFLD    Probe:  M    Universal Protocol: Patient's identity, procedure and site were verified, confirmatory pause was performed.  Discussed procedure including risks and potential complications.  Questions answered.  Patient verbalizes understanding and wishes to proceed with VCTE.     Procedure: After providing explanations of the procedure, patient was placed in the supine position with right arm in maximum abduction to allow optimal exposure of right lateral abdomen.  Patient was briefly assessed, Testing was performed in the mid-axillary location, 50Hz Shear Wave pulses were applied and the resulting Shear Wave and Propagation Speed detected with a 3.5 MHz ultrasonic signal, using the FibroScan probe, Skin to liver capsule distance and liver parenchyma were accessed during the entire examination with the FibroScan probe, Patient was instructed to breathe normally and to abstain from sudden movements during the procedure, allowing for random measurements of liver stiffness. At least 10 Shear Waves were produced, Individual measurements of each Shear Wave were calculated.  Patient tolerated the procedure well with no complications.  Meets discharge criteria as was dismissed.  Rates pain 0 out of 10.  Patient will follow up with ordering provider to review results.    Findings  Median liver stiffness score:  8.7  CAP Reading: dB/m:  256    IQR/med %:  11  Interpretation  Fibrosis interpretation is based on medial liver stiffness - Kilopascal (kPa).    Fibrosis Stage:  F2  Steatosis interpretation is based on controlled attenuation parameter - (dB/m).    Steatosis Grade:  S1  Comments/Plan:    Fibrosis Stage F2 = Moderate amount of (fibrosis) scarring in the liver    Steatosis Grade S1 = >11% but <34% fat in the  liver    Yeni Pro MD

## 2025-06-10 NOTE — TELEPHONE ENCOUNTER
----- Message from Yeni Pro MD sent at 6/10/2025  4:55 AM CDT -----  Please send this report to patient:    Fibroscan 6/4/2025    Findings  Median liver stiffness score:  8.7  CAP Reading: dB/m:  256     IQR/med %:  11  Interpretation  Fibrosis interpretation is based on medial liver stiffness - Kilopascal (kPa).     Fibrosis Stage:  F2  Steatosis interpretation is based on controlled attenuation parameter - (dB/m).     Steatosis Grade:  S1  Comments/Plan:    Fibrosis Stage F2 = Moderate amount of (fibrosis) scarring in the liver     Steatosis Grade S1 = >11% but <34% fat in the liver     Yeni Pro MD

## 2025-06-10 NOTE — PATIENT INSTRUCTIONS
Fibroscan 6/4/2025    Findings  Median liver stiffness score:  8.7  CAP Reading: dB/m:  256     IQR/med %:  11  Interpretation  Fibrosis interpretation is based on medial liver stiffness - Kilopascal (kPa).     Fibrosis Stage:  F2  Steatosis interpretation is based on controlled attenuation parameter - (dB/m).     Steatosis Grade:  S1  Comments/Plan:    Fibrosis Stage F2 = Moderate amount of (fibrosis) scarring in the liver     Steatosis Grade S1 = >11% but <34% fat in the liver     Yeni Pro MD